# Patient Record
Sex: FEMALE | Race: BLACK OR AFRICAN AMERICAN | Employment: FULL TIME | URBAN - METROPOLITAN AREA
[De-identification: names, ages, dates, MRNs, and addresses within clinical notes are randomized per-mention and may not be internally consistent; named-entity substitution may affect disease eponyms.]

---

## 2017-08-15 PROBLEM — E11.69 DIABETES MELLITUS TYPE 2 IN OBESE (HCC): Status: ACTIVE | Noted: 2017-08-15

## 2019-06-19 PROBLEM — E66.811 CLASS 1 OBESITY DUE TO EXCESS CALORIES WITH SERIOUS COMORBIDITY AND BODY MASS INDEX (BMI) OF 33.0 TO 33.9 IN ADULT: Status: ACTIVE | Noted: 2019-06-19

## 2020-12-29 DIAGNOSIS — Z20.822 EXPOSURE TO COVID-19 VIRUS: ICD-10-CM

## 2020-12-30 LAB — SARS-COV-2, PCR: NOT DETECTED

## 2021-04-22 DIAGNOSIS — Z20.822 SUSPECTED COVID-19 VIRUS INFECTION: ICD-10-CM

## 2021-04-23 LAB — SARS-COV-2, PCR: NOT DETECTED

## 2023-10-10 ENCOUNTER — HOSPITAL ENCOUNTER (OUTPATIENT)
Dept: GENERAL RADIOLOGY | Age: 63
Discharge: HOME | End: 2023-10-12
Payer: COMMERCIAL

## 2023-10-10 DIAGNOSIS — Z13.820 ENCOUNTER FOR OSTEOPOROSIS SCREENING IN ASYMPTOMATIC POSTMENOPAUSAL PATIENT: ICD-10-CM

## 2023-10-10 DIAGNOSIS — Z12.31 ENCOUNTER FOR SCREENING MAMMOGRAM FOR BREAST CANCER: ICD-10-CM

## 2023-10-10 DIAGNOSIS — Z78.0 ENCOUNTER FOR OSTEOPOROSIS SCREENING IN ASYMPTOMATIC POSTMENOPAUSAL PATIENT: ICD-10-CM

## 2023-10-10 PROCEDURE — 77063 BREAST TOMOSYNTHESIS BI: CPT

## 2023-10-10 PROCEDURE — 77080 DXA BONE DENSITY AXIAL: CPT

## 2024-10-16 ENCOUNTER — HOSPITAL ENCOUNTER (OUTPATIENT)
Dept: GENERAL RADIOLOGY | Age: 64
Discharge: HOME | End: 2024-10-18
Payer: COMMERCIAL

## 2024-10-16 VITALS — HEIGHT: 67 IN | BODY MASS INDEX: 30.29 KG/M2 | WEIGHT: 193 LBS

## 2024-10-16 DIAGNOSIS — Z12.31 ENCOUNTER FOR SCREENING MAMMOGRAM FOR MALIGNANT NEOPLASM OF BREAST: ICD-10-CM

## 2024-10-16 PROCEDURE — 77063 BREAST TOMOSYNTHESIS BI: CPT

## 2024-10-17 ENCOUNTER — CLINICAL DOCUMENTATION (OUTPATIENT)
Dept: GENERAL RADIOLOGY | Age: 64
End: 2024-10-17

## 2024-10-17 NOTE — PROGRESS NOTES
Mammogram clinical report and patient result letter provided to patient.  Report sent to Dr. Scott, per patient request on Authorization to Release the Results of Mammogram form.

## 2024-11-13 ENCOUNTER — HOSPITAL ENCOUNTER (OUTPATIENT)
Dept: NEUROLOGY | Age: 64
Discharge: HOME | End: 2024-11-13
Payer: COMMERCIAL

## 2024-11-13 VITALS — WEIGHT: 193 LBS | BODY MASS INDEX: 30.29 KG/M2 | HEIGHT: 67 IN

## 2024-11-13 PROCEDURE — 95886 MUSC TEST DONE W/N TEST COMP: CPT

## 2024-11-13 PROCEDURE — 95911 NRV CNDJ TEST 9-10 STUDIES: CPT

## 2024-11-13 NOTE — PROCEDURES
PROCEDURE NOTE  Date: 11/13/2024   Name: Lucina Brown  YOB: 1960    Procedures          [facility]  [address] [place] [address]  Phone: [phone]  Fax: [phone]      Referring Provider: Cindy Scott MD  Primary Care Physician: Cindy Scott MD  Patient Name: Lucina Brown  Patient YOB: 1960  Gender: female  BMI: Body mass index is 30.22 kg/m².  Height 1.702 m (5' 7.01\"), weight 87.5 kg (193 lb), not currently breastfeeding.    11/13/2024    Reason for referral: Numbness and tingling right hand; carpal tunnel syndrome    Description of clinical problem:   Patient reports numbness/tingling and pain in the right hand, which is worsening over several years. She endorses weakness in the right hand. No proximal symptoms reported. She has history of prior left carpal tunnel release surgery. She denies symptoms in the left upper extremity.       Pain: Yes   ; Numbness/tingling:  Yes; Weakness:  Yes       Brief physical exam:   Sensory deficit: No; Weakness: No; Atrophy:  No; Reflex abnormality: No      Study Limitations:  None      Motor NCS      Nerve / Sites Lat. Amplitude Distance Velocity Temp.    ms mV cm m/s °C   R Median - APB      Wrist 5.05 9.4 8  32.1      Elbow 9.43 8.9 24 55 32.2   L Median - APB      Wrist 4.01 8.3 8  32      Elbow 8.33 7.9 24 56 32   R Ulnar - ADM      Wrist 2.92 10.1 8  32.5      B.Elbow 6.61 10.0 22 59 32.5      A.Elbow 8.28 9.8 10 60 32.5       Sensory NCS      Nerve / Sites Onset Lat Peak Lat PP Amp Distance Velocity Temp.    ms ms µV cm m/s °C   R Median - Digit II (Antidromic)      Mid Palm 1.51 2.03 34.0 7 46 32.2      Wrist 3.96 4.84 31.1 14 35 32.2   L Median - Digit II (Antidromic)      Mid Palm 1.30 2.19 43.3 7 54 32      Wrist 2.81 3.70 43.1 14 50 32   R Ulnar - Digit V (Antidromic)      Wrist 2.34 3.23 57.6 14 60 32.2   R Radial - Anatomical snuff box (Forearm)      Forearm

## 2024-12-12 ENCOUNTER — HOSPITAL ENCOUNTER (OUTPATIENT)
Age: 64
Discharge: HOME | End: 2024-12-12
Payer: COMMERCIAL

## 2024-12-12 LAB — RHEUMATOID FACT SER NEPH-ACNC: <10 IU/ML (ref 0–13)

## 2024-12-12 PROCEDURE — 36415 COLL VENOUS BLD VENIPUNCTURE: CPT

## 2024-12-12 PROCEDURE — 86431 RHEUMATOID FACTOR QUANT: CPT

## 2024-12-12 PROCEDURE — 86038 ANTINUCLEAR ANTIBODIES: CPT

## 2024-12-12 PROCEDURE — 86039 ANTINUCLEAR ANTIBODIES (ANA): CPT

## 2024-12-12 PROCEDURE — 86235 NUCLEAR ANTIGEN ANTIBODY: CPT

## 2024-12-13 LAB
ANA SER QL IA: NEGATIVE
SJOGREN'S ANTIBODIES (SSA): NEGATIVE
SJOGREN'S ANTIBODIES (SSB): NEGATIVE